# Patient Record
Sex: MALE | Race: OTHER | NOT HISPANIC OR LATINO | Employment: FULL TIME | URBAN - METROPOLITAN AREA
[De-identification: names, ages, dates, MRNs, and addresses within clinical notes are randomized per-mention and may not be internally consistent; named-entity substitution may affect disease eponyms.]

---

## 2023-06-15 ENCOUNTER — OFFICE VISIT (OUTPATIENT)
Dept: PRIMARY CARE | Facility: CLINIC | Age: 43
End: 2023-06-15
Payer: COMMERCIAL

## 2023-06-15 VITALS — DIASTOLIC BLOOD PRESSURE: 80 MMHG | WEIGHT: 208 LBS | SYSTOLIC BLOOD PRESSURE: 122 MMHG

## 2023-06-15 DIAGNOSIS — H10.31 ACUTE CONJUNCTIVITIS OF RIGHT EYE, UNSPECIFIED ACUTE CONJUNCTIVITIS TYPE: Primary | ICD-10-CM

## 2023-06-15 PROBLEM — A08.4 VIRAL GASTROENTERITIS: Status: RESOLVED | Noted: 2023-06-15 | Resolved: 2023-06-15

## 2023-06-15 PROBLEM — M72.2 PLANTAR FASCIITIS, LEFT: Status: ACTIVE | Noted: 2023-06-15

## 2023-06-15 PROBLEM — M72.2 PLANTAR FASCIITIS, RIGHT: Status: ACTIVE | Noted: 2023-06-15

## 2023-06-15 PROBLEM — R10.30 LOWER ABDOMINAL PAIN: Status: RESOLVED | Noted: 2023-06-15 | Resolved: 2023-06-15

## 2023-06-15 PROBLEM — R50.9 FEVER: Status: RESOLVED | Noted: 2023-06-15 | Resolved: 2023-06-15

## 2023-06-15 PROBLEM — J06.9 VIRAL UPPER RESPIRATORY TRACT INFECTION: Status: RESOLVED | Noted: 2023-06-15 | Resolved: 2023-06-15

## 2023-06-15 PROBLEM — K59.00 CONSTIPATION: Status: ACTIVE | Noted: 2023-06-15

## 2023-06-15 PROBLEM — M79.604 PAINFUL LEGS AND MOVING TOES OF RIGHT FOOT: Status: RESOLVED | Noted: 2023-06-15 | Resolved: 2023-06-15

## 2023-06-15 PROBLEM — K59.00 OBSTIPATION: Status: ACTIVE | Noted: 2017-06-08

## 2023-06-15 PROBLEM — J02.0 STREP THROAT: Status: RESOLVED | Noted: 2023-06-15 | Resolved: 2023-06-15

## 2023-06-15 PROBLEM — M79.674 PAINFUL LEGS AND MOVING TOES OF RIGHT FOOT: Status: RESOLVED | Noted: 2023-06-15 | Resolved: 2023-06-15

## 2023-06-15 PROBLEM — T78.40XA ALLERGY: Status: ACTIVE | Noted: 2023-06-15

## 2023-06-15 PROCEDURE — 99213 OFFICE O/P EST LOW 20 MIN: CPT | Performed by: INTERNAL MEDICINE

## 2023-06-15 RX ORDER — TOBRAMYCIN 3 MG/ML
2 SOLUTION/ DROPS OPHTHALMIC
Qty: 16.8 ML | Refills: 0 | Status: SHIPPED | OUTPATIENT
Start: 2023-06-15 | End: 2023-06-29

## 2023-06-15 RX ORDER — POLYETHYLENE GLYCOL 3350 17 G/17G
17 POWDER, FOR SOLUTION ORAL
COMMUNITY
Start: 2020-01-20

## 2023-06-15 ASSESSMENT — PATIENT HEALTH QUESTIONNAIRE - PHQ9
2. FEELING DOWN, DEPRESSED OR HOPELESS: NOT AT ALL
1. LITTLE INTEREST OR PLEASURE IN DOING THINGS: NOT AT ALL
SUM OF ALL RESPONSES TO PHQ9 QUESTIONS 1 AND 2: 0

## 2023-06-15 NOTE — PROGRESS NOTES
Subjective   Patient ID: [id number]     Avni Danielson is a 43 y.o. male who presents for eye redness (Right eye).    Current Outpatient Medications:     polyethylene glycol (Glycolax) 17 gram/dose powder, Take 17 g by mouth once daily., Disp: , Rfl:   HPI  43-year-old patient presenting to the office today to discuss concerns regarding pinkeye.  Patient stated that he was working yard yesterday doing a lot of yard work and after he took a shower he noticed that the conjunctiva of his right eye is red and itchy.  This morning he woke up and it was crusty as well as really red.  He does not have any pain in his eye no blurring in his vision he does not wear contacts no trauma to the eye.  He does not have any respiratory symptoms.  Review of system was reviewed all normal except what is noted in HPI   Past Medical History:   Diagnosis Date    Fever 06/15/2023    Painful legs and moving toes of right foot 06/15/2023    Strep throat 06/15/2023    Viral upper respiratory tract infection 06/15/2023      Objective   /80   Wt 94.3 kg (208 lb)      Physical Exam  Constitutional:       Appearance: Normal appearance.   Eyes:      General:         Right eye: Discharge present.      Comments: The right conjunctiva appears to be pink on examination.   Cardiovascular:      Rate and Rhythm: Normal rate and regular rhythm.      Pulses: Normal pulses.      Heart sounds: Normal heart sounds.   Pulmonary:      Effort: Pulmonary effort is normal.      Breath sounds: Normal breath sounds.   Neurological:      Mental Status: He is alert.         Assessment/Plan   Problem List Items Addressed This Visit    None    43-year-old patient with the following issues.    1.  Concerns regarding acute conjunctivitis in the right eye, at this point I will treat the patient with tobramycin eyedrops.  He was instructed to continue washing his hands and not to touch the other eye.  To start treating the other eye if it becomes symptomatic.   Patient was instructed to seek further medical care if his symptoms worsen and do not improve patient stated understanding.  Sujata Galicia MD

## 2023-11-08 ENCOUNTER — TELEMEDICINE (OUTPATIENT)
Dept: PRIMARY CARE | Facility: CLINIC | Age: 43
End: 2023-11-08
Payer: COMMERCIAL

## 2023-11-08 DIAGNOSIS — H10.31 ACUTE BACTERIAL CONJUNCTIVITIS OF RIGHT EYE: Primary | ICD-10-CM

## 2023-11-08 PROCEDURE — 99213 OFFICE O/P EST LOW 20 MIN: CPT | Performed by: FAMILY MEDICINE

## 2023-11-08 RX ORDER — TOBRAMYCIN 3 MG/ML
2 SOLUTION/ DROPS OPHTHALMIC EVERY 4 HOURS
Qty: 4.2 ML | Refills: 0 | Status: SHIPPED | OUTPATIENT
Start: 2023-11-08 | End: 2023-11-15

## 2023-11-08 ASSESSMENT — ENCOUNTER SYMPTOMS
EYE DISCHARGE: 1
EYE PAIN: 0
EYE REDNESS: 1
PHOTOPHOBIA: 0
EYE ITCHING: 0

## 2024-04-03 ENCOUNTER — OFFICE VISIT (OUTPATIENT)
Dept: PRIMARY CARE | Facility: CLINIC | Age: 44
End: 2024-04-03
Payer: COMMERCIAL

## 2024-04-03 ENCOUNTER — HOSPITAL ENCOUNTER (OUTPATIENT)
Dept: RADIOLOGY | Facility: CLINIC | Age: 44
Discharge: HOME | End: 2024-04-03
Payer: COMMERCIAL

## 2024-04-03 VITALS — DIASTOLIC BLOOD PRESSURE: 80 MMHG | TEMPERATURE: 97.2 F | WEIGHT: 215 LBS | SYSTOLIC BLOOD PRESSURE: 124 MMHG

## 2024-04-03 DIAGNOSIS — M25.562 ACUTE PAIN OF LEFT KNEE: Primary | ICD-10-CM

## 2024-04-03 DIAGNOSIS — M25.562 ACUTE PAIN OF LEFT KNEE: ICD-10-CM

## 2024-04-03 PROCEDURE — 1036F TOBACCO NON-USER: CPT | Performed by: FAMILY MEDICINE

## 2024-04-03 PROCEDURE — 73562 X-RAY EXAM OF KNEE 3: CPT | Mod: LT

## 2024-04-03 PROCEDURE — 73562 X-RAY EXAM OF KNEE 3: CPT | Mod: LEFT SIDE | Performed by: RADIOLOGY

## 2024-04-03 PROCEDURE — 99213 OFFICE O/P EST LOW 20 MIN: CPT | Performed by: FAMILY MEDICINE

## 2024-04-03 RX ORDER — MELOXICAM 7.5 MG/1
7.5 TABLET ORAL DAILY
Qty: 20 TABLET | Refills: 0 | Status: SHIPPED | OUTPATIENT
Start: 2024-04-03 | End: 2025-04-03

## 2024-04-03 NOTE — PROGRESS NOTES
Subjective   Patient ID: Avni Danielson is a 43 y.o. male who presents for Knee Pain (left).    Pt presents for left knee pain  All of the sudden one night he had sudden knee pain    Difficulty to bear weight at times  Epsom salt soaks at times            Review of Systems    Objective   /80 (BP Location: Right arm, Patient Position: Sitting)   Temp 36.2 °C (97.2 °F)   Wt 97.5 kg (215 lb)     Physical Exam  Musculoskeletal:         General: No swelling, tenderness, deformity or signs of injury.      Left knee: Crepitus present. No lacerations or bony tenderness. Normal range of motion. No tenderness.         Assessment/Plan   Diagnoses and all orders for this visit:  Acute pain of left knee  -     XR knee left 4+ views; Future  -     Referral to Orthopaedic Surgery; Future  -     meloxicam (Mobic) 7.5 mg tablet; Take 1 tablet (7.5 mg) by mouth once daily.    Tiffany Vee, DO

## 2024-04-05 ENCOUNTER — TELEPHONE (OUTPATIENT)
Dept: PRIMARY CARE | Facility: CLINIC | Age: 44
End: 2024-04-05
Payer: COMMERCIAL

## 2024-04-05 NOTE — TELEPHONE ENCOUNTER
Please notify pt that his knee Xray result is normal,  I see that he has a 4/9 appt with Ortho,  Thank you,  Tiffany Vee, DO

## 2024-04-09 ENCOUNTER — OFFICE VISIT (OUTPATIENT)
Dept: ORTHOPEDIC SURGERY | Facility: CLINIC | Age: 44
End: 2024-04-09
Payer: COMMERCIAL

## 2024-04-09 DIAGNOSIS — M25.562 ACUTE PAIN OF LEFT KNEE: ICD-10-CM

## 2024-04-09 PROCEDURE — 99203 OFFICE O/P NEW LOW 30 MIN: CPT | Performed by: ORTHOPAEDIC SURGERY

## 2024-04-09 PROCEDURE — 99213 OFFICE O/P EST LOW 20 MIN: CPT | Performed by: ORTHOPAEDIC SURGERY

## 2024-04-09 PROCEDURE — 1036F TOBACCO NON-USER: CPT | Performed by: ORTHOPAEDIC SURGERY

## 2024-04-09 NOTE — PROGRESS NOTES
History of Present Illness   The patient presents today endorsing left knee pain. The pain localizes over the lateral joint line.  The patient endorses a minor twisting injury and the acute onset of pain, denying antecedent symptoms.  The pain is achy, constant, worse with activity and better with rest.  His knee locked up on him and he had to move it in a certain way to get it to unlock.  This happened again soon thereafter and he had an inability to ambulate due to the pain and decreased motion but he was able to take a shower and gently move it and it did loosen back up    The patient has tried the following modalities rest, ice, anti-inflammatories.    He works at a coffee shop..    Past Medical History:   Diagnosis Date    Fever 06/15/2023    Painful legs and moving toes of right foot 06/15/2023    Strep throat 06/15/2023    Viral upper respiratory tract infection 06/15/2023     History reviewed. No pertinent surgical history.    Current Outpatient Medications:     meloxicam (Mobic) 7.5 mg tablet, Take 1 tablet (7.5 mg) by mouth once daily., Disp: 20 tablet, Rfl: 0    polyethylene glycol (Glycolax) 17 gram/dose powder, Take 17 g by mouth once daily., Disp: , Rfl:     Review of Systems   GENERAL: Negative for malaise, significant weight loss, fever  MUSCULOSKELETAL: See HPI  NEURO:  Negative for numbness / tingling     Physical Examination:  Left Knee:  Skin healthy and intact  No gross swelling or ecchymosis  No significant varus or valgus malalignment  Effusion: mild    ROM:  Full flexion   Full extension  No pain with internal rotation of the hip  Tenderness to palpation:  lateral joint line   No laxity to valgus stress  No laxity to varus stress  Negative Lachman´s test  Negative anterior drawer test  Negative posterior drawer test  Positive Wyatt´s test  Neurovascular exam normal distally    Imaging:  No significant degenerative changes noted    Assessment:    Patient with left knee pain concern for  meniscal tear, due to 2 episodes of a locked knee    Plan:  We discussed with the patient concern for a meniscal tear.   We reviewed the natural history of meniscal pathology and discussed the implications for the health of the joint.  Various treatment options were discussed and the patient elected for MRI as he has had several episodes of the knee locking on him and have significant concern about the disability associated with that.

## 2024-04-30 ENCOUNTER — HOSPITAL ENCOUNTER (OUTPATIENT)
Dept: RADIOLOGY | Facility: CLINIC | Age: 44
Discharge: HOME | End: 2024-04-30
Payer: COMMERCIAL

## 2024-04-30 DIAGNOSIS — M25.562 ACUTE PAIN OF LEFT KNEE: ICD-10-CM

## 2024-04-30 PROCEDURE — 73721 MRI JNT OF LWR EXTRE W/O DYE: CPT | Mod: LEFT SIDE | Performed by: RADIOLOGY

## 2024-04-30 PROCEDURE — 73721 MRI JNT OF LWR EXTRE W/O DYE: CPT | Mod: LT
